# Patient Record
Sex: MALE | Race: WHITE | NOT HISPANIC OR LATINO | URBAN - METROPOLITAN AREA
[De-identification: names, ages, dates, MRNs, and addresses within clinical notes are randomized per-mention and may not be internally consistent; named-entity substitution may affect disease eponyms.]

---

## 2022-01-17 ENCOUNTER — TELEPHONE (OUTPATIENT)
Dept: GASTROENTEROLOGY | Facility: CLINIC | Age: 62
End: 2022-01-17

## 2022-01-17 NOTE — TELEPHONE ENCOUNTER
Recall call went out via [employer] in 2020 with no return calls from pt to schedule  Pt is due for a colon with Dr Titi Partida for hx of tubular adenoma polyps  I lmom for pt to please call back to schedule a colonoscopy with Dr Titi Partida  Will call pt again in one week if do not hear back from him

## 2022-01-24 NOTE — TELEPHONE ENCOUNTER
I lmom for pt to please call back to schedule a colonoscopy with Dr Jose F Kelsey  Will call pt again in two weeks if do not hear back from him

## 2022-02-07 NOTE — TELEPHONE ENCOUNTER
I lmom for pt to please call back to schedule a procedure with Dr Naun Jung  Will wait for pt's call back at this time